# Patient Record
(demographics unavailable — no encounter records)

---
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
## 2024-10-30 NOTE — ASSESSMENT
[FreeTextEntry1] : 54 year M with bilateral knee OA, prior arthroscopy R knee gel with MD CHAPIS CSI with minimal relief recommend weight loss   Time Based billin minutes was spent with the patient today taking the patient's history, conducting a physical examination, reviewing imaging studies, and  detailed discussion regarding the diagnosis and treatment plan.

## 2024-10-30 NOTE — IMAGING
[de-identified] : Constitutional: well developed and well nourished, able to communicate Cardiovascular: Peripheral vascular exam is grossly normal Neurologic: Alert and oriented, no acute distress. Skin: normal skin with no ulcers, rashes, or lesions Pulmonary: No respiratory distress, breathing comfortably on room air Lymphatics: No obvious lymphadenopathy or lymphedema in areas examined  ZEV KNEE EXAM Alignment: Varus  Effusion: None Atrophy: None                                                  Stable to Varus/valgus stress Posterior Drawer Test: negative Anterior Drawer Test: Negative Knee Extension/Flexion: 0 / 120  Medial/lateral compartments Medial joint line: POS Tenderness Lateral joint line: No Tenderness Alin test: negative  Patellofemoral joint Medial patellar facet: no tenderness Patellar grind: Negative  Tendons: Pes Anserine: No tenderness Gerdys Tubercle/ IT Band: No tenderness Quadriceps Tendon: No Tenderness patellar tendon: no Tenderness Tibial tubercle: not tenderness Calf: no Tenderness  Neurovascular exam Muscle strength: 5/5 Sensation to light touch: intact Distal pulses: 2+  IMAGING: 10/30/2024 Xrays of the Right Knee were taken demonstrating tricompartmental arthritis with joint space collapse, osteophytes, and sclerosis

## 2024-12-18 NOTE — IMAGING
[de-identified] : LEFT HAND skin intact. mild swelling of 1st ext compartment. TTP to 1st ext compartment. wrist ROM: mild limited extension, flexion. good pronation, supination. good EPL, FPL. good finger extension, flex to full fist. good finger abduction and adduction.  SILT to median, ulnar, radial distribution.  palpable radial pulse, brisk cap refill all digits. no triggering. + Finkelstein's test.   @8/21/24 XRAYS OF LEFT WRIST (3 views - PA, OBLIQUE, AND LATERAL VIEWS): no acute displaced fracture or dislocation.

## 2024-12-18 NOTE — HISTORY OF PRESENT ILLNESS
[de-identified] : 12/18/24: f/u LEFT DeQuervain's tenosynovitis. s/p CSI on 8/21/24. Pain resolved after injection, but has returned. Reports that he is planning to undergo bunion surgery.  8/21/24: 55yo male ([employer]. [employer] ) presents for LEFT radial wrist pain x 1 month. c/o clicking with wrist flexion/extension. Denies injury. Denies numbness, tingling. + thumb spica brace PRN, which helps. + Percocet for knee pain, prescribed by Pain Management.   PMH: CAD. HTN. HLD. h/o PSA. PSH: [facility] (Dr Tristian Benites - L 2018, R 2017). ACDF (2013). R knee PMM. R IHR. Adenoidectomy (2016). Ambulates with cane in RIGHT hand. [FreeTextEntry5] : ERNIE is here today to follow up on his LEFT wrist. CSI resolved symptoms until 12/09/24. requesting repeat CSI today.

## 2024-12-18 NOTE — ASSESSMENT
[FreeTextEntry1] : Reviewed risks and benefits of treatment options for tenosynovitis - activity modification, NSAID, splint, steroid injection, or surgery.  Patient would like to repeat CSI for DeQuervain's tenosynovitis. - Discussed risks, benefits, and alternatives as well as contents of injection. Risks include, but are not limited allergic reaction, flare reaction, injection site pain, bruising, numbness, increased blood sugar, elevated blood pressure, facial flushing, skin discoloration, fat atrophy, tendon rupture, and infection. Risk of immune suppression and increased susceptibility to infection with steroid use. We discussed that too many injections may lead to weakening of the tendon and tendon rupture. Patient expressed understanding and would like to proceed with injection. - The skin over the LEFT wrist 1st extensor compartment just distal to the radial styloid was cleansed with alcohol and anesthetized with ethyl chloride. The 1st extensor compartment was injected with 3mg of celestone, 0.5cc of 1% lidocaine. Site was dressed with gauze and an ACE wrap. Patient tolerated the procedure well. - discussed that it may take up to 1 week for symptoms to improve after CSI. recommend thumb spica brace in the interim.   F/u PRN.
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
## 2025-01-09 NOTE — IMAGING
[de-identified] : Constitutional: well developed and well nourished, able to communicate Cardiovascular: Peripheral vascular exam is grossly normal Neurologic: Alert and oriented, no acute distress. Skin: normal skin with no ulcers, rashes, or lesions Pulmonary: No respiratory distress, breathing comfortably on room air Lymphatics: No obvious lymphadenopathy or lymphedema in areas examined  ZEV KNEE EXAM Alignment: Varus  Effusion: None Atrophy: None                                                  Stable to Varus/valgus stress Posterior Drawer Test: negative Anterior Drawer Test: Negative Knee Extension/Flexion: 0 / 120  Medial/lateral compartments Medial joint line: POS Tenderness Lateral joint line: No Tenderness Alin test: negative  Patellofemoral joint Medial patellar facet: no tenderness Patellar grind: Negative  Tendons: Pes Anserine: No tenderness Gerdys Tubercle/ IT Band: No tenderness Quadriceps Tendon: No Tenderness patellar tendon: no Tenderness Tibial tubercle: not tenderness Calf: no Tenderness  Neurovascular exam Muscle strength: 5/5 Sensation to light touch: intact Distal pulses: 2+  IMAGING: 10/30/2024 Xrays of the Right Knee were taken demonstrating tricompartmental arthritis with joint space collapse, osteophytes, and sclerosis

## 2025-01-09 NOTE — ASSESSMENT
[FreeTextEntry1] : 54 year M with bilateral knee OA, prior arthroscopy R knee gel with MD ADILIA NATION with minimal relief recommend weight loss   01/09/2025:  CSI bilateral knee  Follow up 3 months

## 2025-01-09 NOTE — PROCEDURE
[FreeTextEntry3] : The risks, benefits and alternatives to the injection were discussed with the patient. The decision was made to proceed with the injection to reduce inflammation within the area. Verbal consent was obtained for the procedure. The bilateral knees were cleaned with alcohol and ethyl chloride was sprayed topically. 1cc of 40mg Kenalog and 4cc of 1% lidocaine was injected. The patient tolerated the procedure well and was instructed to ice the affected joint as needed later today. Activities can be performed as tolerated

## 2025-04-24 NOTE — ASSESSMENT
[FreeTextEntry1] : 54 year M with bilateral knee OA, prior arthroscopy R knee gel with MD ADILIA NATION with minimal relief recommend weight loss   2025:  CSI bilateral knee  Follow up 3 months  2025: CSI Bilateral knees Planning for L TKA in ,Vijay Ho M.D. discussed the patients diagnosis and treatment options, non-surgical and surgical, with the patient and/or legal guardian including the potential benefits and complications of each. Potential complications of non-surgical treatments discussed include residual pain and/or disability, non-healing, progression of symptoms and/or disease. Potential complications of surgery discussed include infection, nerve injury, vascular injury, cartilage injury, ligament injury, tendon injury, muscle injury, skin injury, stiffness, instability, weakness, persistent pain, technical or hardware failure, need for additional surgery, re-injury, medical complication, anesthetic related complication, and/or death. All questions were answered. The patient and/or legal guardian has elected to proceed with surgery. Informed consent obtained for L RICHARD TKA                     Preoperative evaluation and testing as needed is advised within 30 days prior to the procedure.  On oxy 10 with PM   Time Based billin minutes was spent with the patient today taking the patient's history, conducting a physical examination, reviewing imaging studies, and  detailed discussion regarding the diagnosis and treatment plan.
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
## 2025-04-24 NOTE — IMAGING
[de-identified] : Constitutional: well developed and well nourished, able to communicate Cardiovascular: Peripheral vascular exam is grossly normal Neurologic: Alert and oriented, no acute distress. Skin: normal skin with no ulcers, rashes, or lesions Pulmonary: No respiratory distress, breathing comfortably on room air Lymphatics: No obvious lymphadenopathy or lymphedema in areas examined  ZEV KNEE EXAM Alignment: Varus  Effusion: None Atrophy: None                                                  Stable to Varus/valgus stress Posterior Drawer Test: negative Anterior Drawer Test: Negative Knee Extension/Flexion: 0 / 120  Medial/lateral compartments Medial joint line: POS Tenderness Lateral joint line: No Tenderness Alin test: negative  Patellofemoral joint Medial patellar facet: no tenderness Patellar grind: Negative  Tendons: Pes Anserine: No tenderness Gerdys Tubercle/ IT Band: No tenderness Quadriceps Tendon: No Tenderness patellar tendon: no Tenderness Tibial tubercle: not tenderness Calf: no Tenderness  Neurovascular exam Muscle strength: 5/5 Sensation to light touch: intact Distal pulses: 2+  IMAGING: 10/30/2024 Xrays of the Right Knee were taken demonstrating tricompartmental arthritis with joint space collapse, osteophytes, and sclerosis